# Patient Record
Sex: FEMALE | Race: BLACK OR AFRICAN AMERICAN | URBAN - METROPOLITAN AREA
[De-identification: names, ages, dates, MRNs, and addresses within clinical notes are randomized per-mention and may not be internally consistent; named-entity substitution may affect disease eponyms.]

---

## 2021-08-31 ENCOUNTER — OFFICE VISIT (OUTPATIENT)
Dept: INTEGRATIVE MEDICINE | Facility: CLINIC | Age: 56
End: 2021-08-31

## 2021-08-31 DIAGNOSIS — M79.642 BILATERAL HAND PAIN: Primary | ICD-10-CM

## 2021-08-31 DIAGNOSIS — M79.641 BILATERAL HAND PAIN: Primary | ICD-10-CM

## 2021-08-31 NOTE — PATIENT INSTRUCTIONS
Anti-Inflammatory Diet:  Reduce/Eliminate processed grains such as breads, and pasta  Reduce/Eliminate Dairy  Reduce/eliminate Sugar and sweets  Increase Leafy greens, berries and fish

## 2021-08-31 NOTE — PROGRESS NOTES
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 Complaints:  1. Bilateral hand pain  2. Anxiety and stress  3.  Hot flashes    Response to last TX: NA    HPI: Diamond complains of anxiety and grief as her fiance and mother  in the [place]

## 2021-09-14 ENCOUNTER — OFFICE VISIT (OUTPATIENT)
Dept: INTEGRATIVE MEDICINE | Facility: CLINIC | Age: 56
End: 2021-09-14

## 2021-09-14 DIAGNOSIS — M79.644 CHRONIC THUMB PAIN, BILATERAL: Primary | ICD-10-CM

## 2021-09-14 DIAGNOSIS — M79.645 CHRONIC THUMB PAIN, BILATERAL: Primary | ICD-10-CM

## 2021-09-14 DIAGNOSIS — G89.29 CHRONIC THUMB PAIN, BILATERAL: Primary | ICD-10-CM

## 2021-09-14 NOTE — PROGRESS NOTES
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 Complaints:  1. Bilateral hand pain  2. Anxiety and stress  3.  Hot flashes     Response/HPI: Had major emotional release during last session and since ethat time has felt emotionally balanced

## 2021-10-26 ENCOUNTER — OFFICE VISIT (OUTPATIENT)
Dept: INTEGRATIVE MEDICINE | Facility: CLINIC | Age: 56
End: 2021-10-26

## 2021-10-26 DIAGNOSIS — M79.644 CHRONIC PAIN OF RIGHT THUMB: Primary | ICD-10-CM

## 2021-10-26 DIAGNOSIS — G89.29 CHRONIC PAIN OF RIGHT THUMB: Primary | ICD-10-CM

## 2021-10-26 NOTE — PROGRESS NOTES
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 Complaints:  1. Right thumb pain  2. Anxiety and stress  3. Hot flashes     Response/HPI: Emotions have been more balanced, although stress has been high and associated with work.   She feels o